# Patient Record
Sex: FEMALE | Race: WHITE | NOT HISPANIC OR LATINO | Employment: STUDENT | URBAN - METROPOLITAN AREA
[De-identification: names, ages, dates, MRNs, and addresses within clinical notes are randomized per-mention and may not be internally consistent; named-entity substitution may affect disease eponyms.]

---
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
## 2021-06-01 RX ORDER — NORETHINDRONE ACETATE AND ETHINYL ESTRADIOL 1.5-30(21)
1 KIT ORAL DAILY
COMMUNITY
Start: 2021-05-24 | End: 2021-06-01 | Stop reason: SDUPTHER

## 2021-06-01 RX ORDER — NORETHINDRONE ACETATE AND ETHINYL ESTRADIOL 1.5-30(21)
1 KIT ORAL DAILY
Qty: 28 TABLET | Refills: 2 | Status: SHIPPED | OUTPATIENT
Start: 2021-06-01 | End: 2021-11-09

## 2024-07-22 ENCOUNTER — OFFICE VISIT (OUTPATIENT)
Dept: NEUROLOGY | Facility: CLINIC | Age: 24
End: 2024-07-22
Payer: COMMERCIAL

## 2024-07-22 VITALS
WEIGHT: 96.44 LBS | SYSTOLIC BLOOD PRESSURE: 107 MMHG | HEIGHT: 59 IN | BODY MASS INDEX: 19.44 KG/M2 | HEART RATE: 74 BPM | RESPIRATION RATE: 17 BRPM | TEMPERATURE: 98 F | DIASTOLIC BLOOD PRESSURE: 71 MMHG

## 2024-07-22 DIAGNOSIS — G43.709 CHRONIC MIGRAINE WITHOUT AURA WITHOUT STATUS MIGRAINOSUS, NOT INTRACTABLE: Primary | ICD-10-CM

## 2024-07-22 DIAGNOSIS — Z15.89 MTHFR MUTATION: ICD-10-CM

## 2024-07-22 PROCEDURE — 99999 PR PBB SHADOW E&M-EST. PATIENT-LVL V: CPT | Mod: PBBFAC,,, | Performed by: PHYSICIAN ASSISTANT

## 2024-07-22 PROCEDURE — 99205 OFFICE O/P NEW HI 60 MIN: CPT | Mod: S$GLB,,, | Performed by: PHYSICIAN ASSISTANT

## 2024-07-22 PROCEDURE — 3074F SYST BP LT 130 MM HG: CPT | Mod: CPTII,S$GLB,, | Performed by: PHYSICIAN ASSISTANT

## 2024-07-22 PROCEDURE — 3078F DIAST BP <80 MM HG: CPT | Mod: CPTII,S$GLB,, | Performed by: PHYSICIAN ASSISTANT

## 2024-07-22 PROCEDURE — 3008F BODY MASS INDEX DOCD: CPT | Mod: CPTII,S$GLB,, | Performed by: PHYSICIAN ASSISTANT

## 2024-07-22 PROCEDURE — 1159F MED LIST DOCD IN RCRD: CPT | Mod: CPTII,S$GLB,, | Performed by: PHYSICIAN ASSISTANT

## 2024-07-22 PROCEDURE — 1160F RVW MEDS BY RX/DR IN RCRD: CPT | Mod: CPTII,S$GLB,, | Performed by: PHYSICIAN ASSISTANT

## 2024-07-22 RX ORDER — GABAPENTIN 100 MG/1
CAPSULE ORAL
Qty: 30 CAPSULE | Refills: 6 | Status: SHIPPED | OUTPATIENT
Start: 2024-07-22

## 2024-07-22 RX ORDER — PREDNISONE 20 MG/1
TABLET ORAL
Qty: 12 TABLET | Refills: 0 | Status: SHIPPED | OUTPATIENT
Start: 2024-07-22

## 2024-07-22 RX ORDER — UBROGEPANT 100 MG/1
TABLET ORAL
Qty: 14 TABLET | Refills: 4 | Status: SHIPPED | OUTPATIENT
Start: 2024-07-22

## 2024-07-22 NOTE — PATIENT INSTRUCTIONS
To reduce migraines:  Try the gabapentin 1 pill 2 hours before bed   PT script given      To factory reset  Try the deltasone (prednisone) on full stomach at breakfast time only, 3 pills for 2 days, 2 pills for 2 days, 1 pill for 2 days then off      To abort headaches:   Ubrelvy, 1 at onset headache, second pill 2 hours later if needed, no more than 2 pills day/3 days use in week <---went to [facility]       Please track your headaches, consider migraine emma free alfredo for [employer]

## 2024-07-22 NOTE — PROGRESS NOTES
[facility]  [address] [facility] [address]  [place] [address]  Phone:[phone]  Fax: [phone]   New Patient Consultation    Patient Name: Prachi Wang  : 2000  MRN:  [id number]  Today: 2024   chief complaint: Headache    PCP: Minnie Gonzalez, DEMETRIUS.       Assessment:   Prachi Wang is a 23 y.o. right handed, female with a PMHx of: MTHFR mutation and HA  whom presents with her mother in self referral for HA. Appears to have chronic migraine without aura. Myofascial pain likely contributes.       Review:    ICD-10-CM ICD-9-CM   1. Chronic migraine without aura without status migrainosus, not intractable  G43.709 346.70   2. MTHFR mutation  Z15.89 V84.89         Plan:   Discussed realistic goals of care with patient at length. Discussed medication options, need for lifestyle adjustment. Discussed treatment will take time. Goal will be to reduce frequency/intensity/quantity of HA, not to be completely HA free. Gave copy of [facility] triggers for migraine informational sheet (N.b., a standard I give to patients who come to seek my care in HA clinic, regardless if they have migraines or not) and discussed clinic's non narcotic policy re: HA. Patient voiced understanding and agreement.            -will have patient track HA, discussed alfredo for smart phone           -will have patient work on lifestyle           -if HA change in quality/nature, will get updated imaging study             -discussed potential for teratogenicity with treatment, if her family planning status should change, discussed I'd like her to let office know immediately. She voice agreement    For HA Prevention:  1 offered pamelor vs topamax vs neurontin (checked ), chose neurontin. Discussed adv effects/dosing, potential interaction with her BC, she voiced agreement   2 PT order given        For HA :  1 will hold off on triptans/ergots, given hx of MTHFR  mutation (increase risk of vascular accident)   2 ubrlevy written, discussed adv effects/dosing, she agreed     To break up Headaches:  Course of deltasone to start tomorrow, discussed taper schedule (wrote it out with read back), take on full stomach at breakfast time only, take until completion and discussed side effects. The patient agreed.       Other:  N/a           All test results as well as any necessary instructions were reviewed and discussed with patient.    Review:  Orders Placed This Encounter    Ambulatory referral/consult to Physical/Occupational Therapy    gabapentin (NEURONTIN) 100 MG capsule    predniSONE (DELTASONE) 20 MG tablet    ubrogepant (UBRELVY) 100 mg tablet         Patient to return to PCP/other specialists for all other problems  Patient to continue on all medications as Rx'd   A detailed AVS was provided to the patient with patient readback   RTO- 6-8 weeks to review HA log   The patient indicates understanding of these issues and agrees to the plan.    HPI:   Prachi Wang is a 23 y.o.right handed, female with a PMHx of: MTHFR mutation and HA  whom presents with her mother in self referral for HA.     ER visit 7/12/2024, notes reviewed.     HA HPI:  Start:2018 (wearing headphones/going on trips) possibly triggered her HA, have been persistant over years, worse just recently (went to ER---notes reviewed, 7/12/2024).    History of trauma (no), History of CNS infection (no), History of Stroke (no)  Location:back of head (L>R), can move to high parietal   Severity: range: 2-10/10  Duration:all day, worse upon awakening   Frequency:daily 30 HD/30   Associated factors (bolded positive) WITH HA ( or migraine): Nausea, vomiting, photophobia, feeling off balance, phonophobia, tinnitus, scalp pain, vision loss, diplopia, scintillations, eye pain, jaw pain, weakness?    Tried:OTC   Triggers (in bold): wearing head phone, looking at screen, stress, lack of sleep, too much  caffeine, too little caffeine, weather change, seasonal change, strong odours, bright lights, sunlight, food ,other  Last HA: 2 days ago   Positives in bold: Hx of Kidney Stones, asthma, GI bleed, osteoporosis, CAD/MI, CVA/TIA, DM  <---denies   Imaging on file: none   Therapies tried in past: (failures to be marked, if known---why did it fail?)  Compazine injection  Benadryl injection  Toradol injection  Ativan  Celexa   Muscle relaxant (unknown).   Doxepin      Medication Reconciliation:   Current Outpatient Medications   Medication Sig Dispense Refill    BLISOVI FE 1.5/30, 28, 1.5 mg-30 mcg (21)/75 mg (7) tablet TAKE ONE TABLET BY MOUTH ONCE DAILY 28 tablet 1    fish oil-omega-3 fatty acids 300-1,000 mg capsule Take 2 capsules by mouth once daily.      LORazepam (ATIVAN) 0.5 MG tablet Take 1 tablet (0.5 mg total) by mouth every 12 (twelve) hours as needed for Anxiety. 60 tablet 2    magnesium citrate solution Take 296 mLs by mouth daily as needed.      multivitamin capsule Take 1 capsule by mouth once daily.      vitamin D (VITAMIN D3) 1000 units Tab Take 1,000 Units by mouth once daily.       No current facility-administered medications for this visit.     Review of patient's allergies indicates:   Allergen Reactions    Citalopram analogues Anaphylaxis     Throat swelling       PMHx:  Past Medical History:   Diagnosis Date    Constipation     MTHFR mutation     Syncope     UTI (urinary tract infection)      Past Surgical History:   Procedure Laterality Date    DENTAL SURGERY         Fhx:  Family History   Problem Relation Name Age of Onset    Brain cancer Maternal Grandmother      Diabetes Paternal Grandmother      Cancer Paternal Grandmother         Shx: respiratory therapist at [employer]   Social History     Socioeconomic History    Marital status: Single   Tobacco Use    Smoking status: Never    Smokeless tobacco: Never   Substance and Sexual Activity    Alcohol use: No    Drug use: No    Sexual activity:  Never     Social Determinants of Health     Financial Resource Strain: Low Risk  (7/16/2024)    Overall Financial Resource Strain (CARDIA)     Difficulty of Paying Living Expenses: Not hard at all   Food Insecurity: No Food Insecurity (7/16/2024)    Hunger Vital Sign     Worried About Running Out of Food in the Last Year: Never true     Ran Out of Food in the Last Year: Never true   Physical Activity: Insufficiently Active (7/16/2024)    Exercise Vital Sign     Days of Exercise per Week: 3 days     Minutes of Exercise per Session: 30 min   Stress: Stress Concern Present (7/16/2024)    [employer] - Occupational Stress Questionnaire     Feeling of Stress : Very much   Housing Stability: Unknown (7/16/2024)    Housing Stability Vital Sign     Unable to Pay for Housing in the Last Year: No           Labs:   Reviewed in chart     Imaging:   Reviewed in chart       Other testing:  Reviewed in chart     Note: I have independently reviewed any/all imaging/labs/tests and agree with the report (s) as documented.  Any discrepancies will be as noted/demarcated by free text.  LISSETH ZHONG 7/22/2024                     ROS:   Review Of Systems (questions asked, positive or additions in BOLD)  Gen: Weight change, fatigue/malaise, pyrexia   HEENT: Tinnitus, headache,  blurred vision, eye pain, diplopia, photophobia,  nose bleeds, congestion, sore throat, jaw pain, scalp pain, neck stiffness   Card: Palpitations, CP   Pulm: SOB, cough   Vas: Easy bruising, easy bleeding   GI: N/V/D/C, incontinence, hematemesis, hematochezia    : incontinence, hematuria   Endocrine: Temp intolerance, polyuria, polydipsia   M/S: Neck pain, myalgia, back pain, joint pain, falls    Neuro: PER HPI   PSY: Memory loss, confusion, depression, anxiety, trouble in sleep, hallucinations          EXAM:   /71 (BP Location: Right arm, Patient Position: Sitting, BP Method: Medium (Automatic))   Pulse 74   Temp 98.2 °F (36.8 °C)  "(Temporal)   Resp 17   Ht 4' 11" (1.499 m)   Wt 43.8 kg (96 lb 7.2 oz)   LMP 07/08/2024   BMI 19.48 kg/m²    GEN:  NAD  HEENT: NC/AT, Frontalis was NTTP, temporalis was NTTP, vertex NTTP,  nares patent, dentition appropriate,   neck supple, trachea midline, Occiput and trapezius  TTP, sits with head forward posture      EXTREM:   no edema present.    NEURO:  Mental Status:  Awake, alert and appropriately oriented to time, place, and person.  Normal attention and concentration.  Speech is fluent and appropriate language function (I.e., comprehension).     Cranial Nerves:    Pupils are equal and reactive to light.  Extraocular movements are intact and without nystagmus.  Visual fields are full to confrontation testing.  Facial movement is symmetric.  Facial sensation is intact.  Hearing is normal. Uvula in midline. FROM of neck in all (6) directions, shoulder shrug symmetrical. Tongue in midline without fasiculation.     Motor:  RUE:appropriate against gravity and medium force as tested 5/5              LUE: appropriate against gravity and medium force as tested 5/5              RLE:appropriate against gravity and medium force as tested 5/5              LLE: appropriate against gravity and medium force as tested 5/5  Tremor/pronator drift not apparent.    finger extension strength was strong bilat     Sensory:  RUE  intact light touch, proprioception, and temperature  LUE intact light touch, proprioception, and temperature    RLE intact light touch  LLE intact light touch      DTR's:                                            R              L  biceps 2+ 2+         brachioradialis 2+ 2+   Knee jerk 2+ 2+        Coordination:  FTN-WNL.      Gait and Stance: Normal manner of stance and gait function testing. Romberg was negative.          This document has been electronically signed by Mr. Rigoberto George MPA, PA-C on 7/22/2024, I have personally typed this message using the EMR.       Dr Evin MD  was available " during today's visit.     Personal Protective Equipment:    Personal Protective Equipment was used during this encounter including:   non latex gloves.          CC: Minnie Gonzalez NP

## 2024-07-31 ENCOUNTER — NURSE TRIAGE (OUTPATIENT)
Dept: ADMINISTRATIVE | Facility: CLINIC | Age: 24
End: 2024-07-31
Payer: COMMERCIAL

## 2024-07-31 NOTE — TELEPHONE ENCOUNTER
Spoke with pt who reports that she was recently prescribed prednisone. She reports after taking medication she felt her heart beating faster. states HR is between 117-120. Reports feeling anxious. Asking if she should continue taking medication as prescribed. Advised to be seen within next 3 days. Nurse did attempt to reach neurology office regarding matter    Secure chat message sent to Lizzeth Tomlinson RN. Lizette Hardy included on secure chat. States provider are gone for the day, but will send message to provider, who might not see see it until 7:30 AM    Pt informed. Also advised pt can be seen in ED/UC. OOD VV also offered. Verbalized understanding        Reason for Disposition   [1] Palpitations AND [2] no improvement after using Care Advice    Additional Information   Negative: Passed out (e.g., fainted, lost consciousness, blacked out and was not responding)   Negative: Shock suspected (e.g., cold/pale/clammy skin, too weak to stand, low BP, rapid pulse)   Negative: Difficult to awaken or acting confused (e.g., disoriented, slurred speech)   Negative: Visible sweat on face or sweat dripping down face   Negative: Unable to walk, or can only walk with assistance (e.g., requires support)   Negative: [1] Received SHOCK from implantable cardiac defibrillator AND [2] persisting symptoms (i.e., palpitations, lightheadedness)   Negative: [1] Dizziness, lightheadedness, or weakness AND [2] heart beating very rapidly (e.g., > 140 / minute)   Negative: [1] Feeling weak or lightheaded (e.g., woozy, feeling like they might faint) AND [2] heart beating very slowly (e.g., < 50 / minute)   Negative: Sounds like a life-threatening emergency to the triager   Negative: Difficulty breathing   Negative: Feeling weak or lightheaded (e.g., woozy, feeling like they might faint)   Negative: [1] Heart beating very rapidly (e.g., > 140 / minute) AND [2] present now  (Exception: During exercise.)   Negative: Heart beating very slowly  "(e.g., < 50 / minute)  (Exception: Athlete and heart rate normal for caller.)   Negative: New or worsened shortness of breath with activity (dyspnea on exertion)   Negative: Patient sounds very sick or weak to the triager   Negative: [1] Heart beating very rapidly (e.g., > 140 / minute) AND [2] not present now  (Exception: During exercise.)   Negative: [1] Skipped or extra beat(s) AND [2] increases with exercise or exertion   Negative: [1] Skipped or extra beat(s) AND [2] occurs 4 or more times per minute   Negative: New or worsened ankle swelling   Negative: History of heart disease (i.e., heart attack, bypass surgery, angina, angioplasty, CHF)  (Exception: Brief heartbeat symptoms that went away and now feels well.)   Negative: Age > 60 years  (Exception: Brief heartbeat symptoms that went away and now feels well.)   Negative: Taking water pill (i.e., diuretic) or heart medication (e.g., digoxin)   Negative: Wearing a cardiac monitor (e.g., cardiac event, Holter)   Negative: [1] Received SHOCK from implantable cardiac defibrillator AND [2] now feels well   Negative: Heart rhythm alert (e.g., "you have irregular heartbeat") from personal wearable device (e.g., Apple Watch)   Negative: History of hyperthyroidism or taking thyroid medication   Negative: Substance use (drug use) or misuse, known or suspected   Negative: [1] ADHD AND [2] taking stimulant medicine    Protocols used: Heart Rate and Heartbeat [id number]    "

## 2024-08-01 NOTE — TELEPHONE ENCOUNTER
Spoke with patient, she reported starting Prednisone yesterday 7/31/2024 because it was told to her to take at breaksfast with a full stomach. She was unable to eat a full breakfast while working, she waited to a day off to be able to eat a full breakfast.  Therefore she did not start the medication till yesterday.  Today she took the 2 day dose and her heart rate did not increase like the previous day.  Explained the dosing of the Prednisone was for breaking a cycle of headache.  She does need to take it with food and preferably before 12:00 so that it does not cause insomnia at night. She should continue with the medication till the prescribed dosing is complete. Verbalized understanding

## 2024-08-01 NOTE — TELEPHONE ENCOUNTER
I wrote a 5 day course of deltasone for her back on the 22nd, it should be done already. When did she start it and what dose is she at now?

## 2024-12-26 RX ORDER — GABAPENTIN 100 MG/1
CAPSULE ORAL
Qty: 30 CAPSULE | Refills: 6 | Status: SHIPPED | OUTPATIENT
Start: 2024-12-26

## 2025-01-29 ENCOUNTER — OFFICE VISIT (OUTPATIENT)
Dept: NEUROLOGY | Facility: CLINIC | Age: 25
End: 2025-01-29
Payer: COMMERCIAL

## 2025-01-29 VITALS
BODY MASS INDEX: 20.42 KG/M2 | HEIGHT: 59 IN | DIASTOLIC BLOOD PRESSURE: 64 MMHG | TEMPERATURE: 98 F | HEART RATE: 84 BPM | RESPIRATION RATE: 17 BRPM | SYSTOLIC BLOOD PRESSURE: 106 MMHG | WEIGHT: 101.31 LBS

## 2025-01-29 DIAGNOSIS — G43.709 CHRONIC MIGRAINE WITHOUT AURA WITHOUT STATUS MIGRAINOSUS, NOT INTRACTABLE: Primary | ICD-10-CM

## 2025-01-29 DIAGNOSIS — M79.18 MYOFASCIAL PAIN: ICD-10-CM

## 2025-01-29 PROCEDURE — 3008F BODY MASS INDEX DOCD: CPT | Mod: CPTII,S$GLB,, | Performed by: PHYSICIAN ASSISTANT

## 2025-01-29 PROCEDURE — 3074F SYST BP LT 130 MM HG: CPT | Mod: CPTII,S$GLB,, | Performed by: PHYSICIAN ASSISTANT

## 2025-01-29 PROCEDURE — 1159F MED LIST DOCD IN RCRD: CPT | Mod: CPTII,S$GLB,, | Performed by: PHYSICIAN ASSISTANT

## 2025-01-29 PROCEDURE — 99999 PR PBB SHADOW E&M-EST. PATIENT-LVL IV: CPT | Mod: PBBFAC,,, | Performed by: PHYSICIAN ASSISTANT

## 2025-01-29 PROCEDURE — 3078F DIAST BP <80 MM HG: CPT | Mod: CPTII,S$GLB,, | Performed by: PHYSICIAN ASSISTANT

## 2025-01-29 PROCEDURE — 1160F RVW MEDS BY RX/DR IN RCRD: CPT | Mod: CPTII,S$GLB,, | Performed by: PHYSICIAN ASSISTANT

## 2025-01-29 PROCEDURE — 99214 OFFICE O/P EST MOD 30 MIN: CPT | Mod: S$GLB,,, | Performed by: PHYSICIAN ASSISTANT

## 2025-01-29 RX ORDER — TIZANIDINE 2 MG/1
TABLET ORAL
Qty: 30 TABLET | Refills: 6 | Status: SHIPPED | OUTPATIENT
Start: 2025-01-29

## 2025-01-29 RX ORDER — CELECOXIB 100 MG/1
CAPSULE ORAL
Qty: 10 CAPSULE | Refills: 0 | Status: SHIPPED | OUTPATIENT
Start: 2025-01-29

## 2025-01-29 NOTE — PATIENT INSTRUCTIONS
"To reduce headaches:  Try the tizanadine (zanaflex) ---muscle relaxant, 2 hours before bed, use for a few weeks then switch to "as needed/2 hours before bed"---no use with alcohol/no driving   Suggested that the patient research out OTC supplements (stressed NOT FDA regulated and should take at own risk).    To help prevent a headache:    Vitamin B2 (riboflavin)  CoQ10  Magnesium  "Migraeeze" which is petadolex/Vitamin B2/ginger  "Dolovent" which is magnesium/Vitamin B2/coq10    * all of the above can be found locally in a variety of shops or on the internet           To abort headaches:  Ubrelvy      To "factory reset"  Celebrex, 1 pill twice a day with food, take until completion   "

## 2025-01-29 NOTE — PROGRESS NOTES
"[facility]  [address] [facility] [address]  [place] [address]  Phone:[phone]  Fax: [phone]  Follow up visit     Patient Name: Prachi Wang  : 2000  MRN:  [id number]  Today: 2025   LOV: 2024  chief complaint: Headache    PCP: Minnie Gonzalez NP.       Assessment:   Prachi Wang is a 24 y.o. right handed, female with a PMHx of: MTHFR mutation and CLAUDIO  whom presents solo in follow up for HA. Appears to have chronic migraine without aura. Myofascial pain likely contributes.       Review:    ICD-10-CM ICD-9-CM   1. Chronic migraine without aura without status migrainosus, not intractable  G43.709 346.70   2. Myofascial pain  M79.18 729.1           Plan:               -if HA change in quality/nature, will get updated imaging study             -discussed potential for teratogenicity with treatment, if her family planning status should change, discussed I'd like her to let office know immediately. She voice agreement    For HA Prevention:  1Try the tizanadine (zanaflex) ---muscle relaxant, 2 hours before bed, use for a few weeks then switch to "as needed/2 hours before bed"---no use with alcohol/no driving   2 Suggested that the patient research out OTC supplements (stressed NOT FDA regulated and should take at own risk).            To help prevent a headache:          Vitamin B2 (riboflavin)        CoQ10        Magnesium       "Migraeeze" which is petadolex/Vitamin B2/ginger       "Dolovent" which is magnesium/Vitamin B2/coq10       For HA :  1 will hold off on triptans/ergots, given hx of MTHFR mutation (increase risk of vascular accident)   2 ubrlevy      To break up Headaches:  Course of celebrex, discussed adv effects/dosing, take until completion, she agreed     Other:  N/a           All test results as well as any necessary instructions were reviewed and discussed with patient.    Review:  Orders Placed This " "Encounter    tiZANidine (ZANAFLEX) 2 MG tablet    celecoxib (CELEBREX) 100 MG capsule           Patient to return to PCP/other specialists for all other problems  Patient to continue on all medications as Rx'd   A detailed AVS was provided to the patient with patient readback   RTO- 6-8 weeks to review HA log   The patient indicates understanding of these issues and agrees to the plan.    HPI:   Prachi Wang is a 24 y.o.right handed, female with a PMHx of: MTHFR mutation and CLAUDIO  whom presents solo in follow up for HA.     At LOV (7/22/2024):  gabapentin, PT, ubrelvy and deltasone ordered.   Daily CLAUDIO  Has had some pains in frontalis and high parietal region along with LEFT occipitalis  Pain is 24/7  Ubrelvy helps  Was on gabapentin for a week but got nervous and stopped (concern from colleagues that she was "too young to be on this medicine")  Steroids did help but caused palpitations  Never did PT   Not sleeping   Under stress, pending getting   Unclear about her family planning status at this time  No vision changes      From first visit (with additions) on 7/22/2024  ER visit 7/12/2024, notes reviewed.     HA HPI:  Start:2018 (wearing headphones/going on trips) possibly triggered her HA, have been persistant over years, worse just recently (went to ER---notes reviewed, 7/12/2024).    History of trauma (no), History of CNS infection (no), History of Stroke (no)  Location:back of head (L>R), can move to high parietal   Severity: range: 2-10/10  Duration:all day, worse upon awakening   Frequency:daily 30 HD/30   Associated factors (bolded positive) WITH HA ( or migraine): Nausea, vomiting, photophobia, feeling off balance, phonophobia, tinnitus, scalp pain, vision loss, diplopia, scintillations, eye pain, jaw pain, weakness?    Tried:OTC   Triggers (in bold): wearing head phone, looking at screen, stress, lack of sleep, too much caffeine, too little caffeine, weather change, seasonal change, " strong odours, bright lights, sunlight, food ,other  Last HA: 2 days ago   Positives in bold: Hx of Kidney Stones, asthma, GI bleed, osteoporosis, CAD/MI, CVA/TIA, DM  <---denies   Imaging on file: none   Therapies tried in past: (failures to be marked, if known---why did it fail?)  Compazine injection  Benadryl injection  Toradol injection  Ativan  Celexa   Muscle relaxant (unknown).   Doxepin  Gabapentin  Ubrelvy  Deltasone       Medication Reconciliation:   Current Outpatient Medications   Medication Sig Dispense Refill    BLISOVI FE 1.5/30, 28, 1.5 mg-30 mcg (21)/75 mg (7) tablet TAKE ONE TABLET BY MOUTH ONCE DAILY 28 tablet 1    fish oil-omega-3 fatty acids 300-1,000 mg capsule Take 2 capsules by mouth once daily.      magnesium citrate solution Take 296 mLs by mouth daily as needed.      multivitamin capsule Take 1 capsule by mouth once daily.      predniSONE (DELTASONE) 20 MG tablet On full stomach (breakfast): 3 tabs for 2 days, 2 tabs for 2 days, 1 tab for 2 days. Finish 12 tablet 0    ubrogepant (UBRELVY) 100 mg tablet Take 1 tablet at onset headache, second tablet 2 hours later if needed, no more than 2 tablets day/3 days use in week 14 tablet 4    vitamin D (VITAMIN D3) 1000 units Tab Take 1,000 Units by mouth once daily.      gabapentin (NEURONTIN) 100 MG capsule 1 pill 2 hours before bed every night 30 capsule 6     No current facility-administered medications for this visit.     Review of patient's allergies indicates:   Allergen Reactions    Citalopram analogues Anaphylaxis     Throat swelling       PMHx:  Past Medical History:   Diagnosis Date    Constipation     MTHFR mutation     Syncope     UTI (urinary tract infection)      Past Surgical History:   Procedure Laterality Date    DENTAL SURGERY         Fhx:  Family History   Problem Relation Name Age of Onset    Brain cancer Maternal Grandmother      Diabetes Paternal Grandmother      Cancer Paternal Grandmother         Shx: respiratory therapist  at [employer]   Social History     Socioeconomic History    Marital status: Single   Tobacco Use    Smoking status: Never    Smokeless tobacco: Never   Substance and Sexual Activity    Alcohol use: No    Drug use: No    Sexual activity: Never     Social Drivers of Health     Financial Resource Strain: Low Risk  (7/16/2024)    Overall Financial Resource Strain (CARDIA)     Difficulty of Paying Living Expenses: Not hard at all   Food Insecurity: No Food Insecurity (7/16/2024)    Hunger Vital Sign     Worried About Running Out of Food in the Last Year: Never true     Ran Out of Food in the Last Year: Never true   Physical Activity: Insufficiently Active (7/16/2024)    Exercise Vital Sign     Days of Exercise per Week: 3 days     Minutes of Exercise per Session: 30 min   Stress: Stress Concern Present (7/16/2024)    [employer] - Occupational Stress Questionnaire     Feeling of Stress : Very much   Housing Stability: Unknown (7/16/2024)    Housing Stability Vital Sign     Unable to Pay for Housing in the Last Year: No           Labs:   Reviewed in chart     Imaging:   Reviewed in chart       Other testing:  Reviewed in chart     Note: I have independently reviewed any/all imaging/labs/tests and agree with the report (s) as documented.  Any discrepancies will be as noted/demarcated by free text.  LISSETH ZHONG 1/29/2025                     ROS:   Review Of Systems (questions asked, positive or additions in BOLD)  Gen: Weight change, fatigue/malaise, pyrexia   HEENT: Tinnitus, headache,  blurred vision, eye pain, diplopia, photophobia,  nose bleeds, congestion, sore throat, jaw pain, scalp pain, neck stiffness   Card: Palpitations, CP   Pulm: SOB, cough   Vas: Easy bruising, easy bleeding   GI: N/V/D/C, incontinence, hematemesis, hematochezia    : incontinence, hematuria   Endocrine: Temp intolerance, polyuria, polydipsia   M/S: Neck pain, myalgia, back pain, joint pain, falls    Neuro: PER HPI  "  PSY: Memory loss, confusion, depression, anxiety, trouble in sleep, hallucinations          EXAM:   /64 (BP Location: Left arm, Patient Position: Sitting)   Pulse 84   Temp 97.6 °F (36.4 °C) (Temporal)   Resp 17   Ht 4' 11" (1.499 m)   Wt 45.9 kg (101 lb 4.8 oz)   LMP 01/22/2025   BMI 20.46 kg/m²    GEN:  NAD though tearful at times   HEENT: NC/AT, Frontalis was NTTP, temporalis was NTTP, vertex NTTP,  nares patent, dentition appropriate,   neck supple, trachea midline, Occiput and trapezius  TTP,   EXTREM:   no edema present.    NEURO:  Mental Status:  Awake, alert and appropriately oriented to time, place, and person.  Normal attention and concentration.  Speech is fluent and appropriate language function (I.e., comprehension).     Cranial Nerves:      Extraocular movements are intact and without nystagmus.  Visual fields are full to confrontation testing.  Facial movement is symmetric.  Facial sensation is intact.  Hearing is normal. Uvula in midline. FROM of neck in all (6) directions, shoulder shrug symmetrical. Tongue in midline without fasiculation.     Motor:  RUE:appropriate against gravity and medium force as tested 5/5              LUE: appropriate against gravity and medium force as tested 5/5              RLE:appropriate against gravity and medium force as tested 5/5              LLE: appropriate against gravity and medium force as tested 5/5  Tremor/pronator drift not apparent.    finger extension strength was strong bilat     Sensory:  RUE  intact light touch, proprioception   LUE intact light touch, proprioception     RLE intact light touch  LLE intact light touch      DTR's:                                            R              L                  Knee jerk 2+ 2+        Coordination:  FTN-WNL.      Gait and Stance: Normal manner of stance and gait function testing.          This document has been electronically signed by Mr. Rigoberto George MPA, PA-C on 1/29/2025, I have " personally typed this message using the EMR.       Dr Evin MD  was available during today's visit.     Personal Protective Equipment:    Personal Protective Equipment was used during this encounter including:   non latex gloves.          CC: Minnie Gonzalez NP

## 2025-03-03 RX ORDER — TIZANIDINE 2 MG/1
TABLET ORAL
Qty: 30 TABLET | Refills: 6 | Status: SHIPPED | OUTPATIENT
Start: 2025-03-03

## 2025-03-09 ENCOUNTER — PATIENT MESSAGE (OUTPATIENT)
Dept: NEUROLOGY | Facility: CLINIC | Age: 25
End: 2025-03-09
Payer: COMMERCIAL

## 2025-03-10 RX ORDER — TIZANIDINE 2 MG/1
TABLET ORAL
Qty: 30 TABLET | Refills: 6 | Status: SHIPPED | OUTPATIENT
Start: 2025-03-10

## 2025-06-03 ENCOUNTER — OFFICE VISIT (OUTPATIENT)
Dept: NEUROLOGY | Facility: CLINIC | Age: 25
End: 2025-06-03
Payer: COMMERCIAL

## 2025-06-03 ENCOUNTER — TELEPHONE (OUTPATIENT)
Dept: NEUROLOGY | Facility: CLINIC | Age: 25
End: 2025-06-03
Payer: COMMERCIAL

## 2025-06-03 VITALS
SYSTOLIC BLOOD PRESSURE: 104 MMHG | DIASTOLIC BLOOD PRESSURE: 69 MMHG | WEIGHT: 97.88 LBS | BODY MASS INDEX: 19.77 KG/M2 | HEART RATE: 75 BPM

## 2025-06-03 DIAGNOSIS — M54.81 OCCIPITAL NEURALGIA OF LEFT SIDE: ICD-10-CM

## 2025-06-03 DIAGNOSIS — M79.12 MYALGIA OF AUXILIARY MUSCLES, HEAD AND NECK: ICD-10-CM

## 2025-06-03 DIAGNOSIS — R42 LIGHTHEADEDNESS: ICD-10-CM

## 2025-06-03 DIAGNOSIS — G43.709 CHRONIC MIGRAINE WITHOUT AURA WITHOUT STATUS MIGRAINOSUS, NOT INTRACTABLE: Primary | ICD-10-CM

## 2025-06-03 PROCEDURE — 99999 PR PBB SHADOW E&M-EST. PATIENT-LVL IV: CPT | Mod: PBBFAC,,, | Performed by: INTERNAL MEDICINE

## 2025-06-03 RX ORDER — TOPIRAMATE 25 MG/1
25 TABLET, FILM COATED ORAL NIGHTLY
Qty: 30 TABLET | Refills: 11 | Status: SHIPPED | OUTPATIENT
Start: 2025-06-03 | End: 2026-06-03

## 2025-06-03 RX ORDER — DULOXETIN HYDROCHLORIDE 20 MG/1
20 CAPSULE, DELAYED RELEASE ORAL DAILY
Qty: 30 CAPSULE | Refills: 11 | Status: SHIPPED | OUTPATIENT
Start: 2025-06-03 | End: 2025-06-03

## 2025-06-06 ENCOUNTER — PATIENT MESSAGE (OUTPATIENT)
Dept: NEUROLOGY | Facility: CLINIC | Age: 25
End: 2025-06-06
Payer: COMMERCIAL

## 2025-06-13 ENCOUNTER — TELEPHONE (OUTPATIENT)
Dept: NEUROLOGY | Facility: CLINIC | Age: 25
End: 2025-06-13
Payer: COMMERCIAL

## 2025-06-13 NOTE — TELEPHONE ENCOUNTER
Copied from CRM #[id number]. Topic: Appointments - Amb Referral  >> Jun 12, 2025  4:46 PM Etta wrote:  Pt reqeusting orders mri of brain be faxed over [email or web address] in [facility] fax number [phone]

## 2025-06-17 ENCOUNTER — TELEPHONE (OUTPATIENT)
Dept: NEUROLOGY | Facility: CLINIC | Age: 25
End: 2025-06-17
Payer: COMMERCIAL

## 2025-06-17 ENCOUNTER — PATIENT MESSAGE (OUTPATIENT)
Facility: CLINIC | Age: 25
End: 2025-06-17
Payer: COMMERCIAL

## 2025-06-17 ENCOUNTER — TELEPHONE (OUTPATIENT)
Facility: CLINIC | Age: 25
End: 2025-06-17
Payer: COMMERCIAL

## 2025-06-17 NOTE — TELEPHONE ENCOUNTER
Copied from CRM #[id number]. Topic: General Inquiry - Patient Advice  >> Jun 17, 2025  3:58 PM Roberta wrote:  Type:  Patient Call    Who Called:Pt   Would the patient rather a call back or a response via [facility]? Call back   Best Call Back Number:[phone]  Additional Information: need something from provider emailed to  [email or web address]  to show she has an MRI on 06/23 to be excused from jury duty

## 2025-06-23 ENCOUNTER — PATIENT MESSAGE (OUTPATIENT)
Facility: CLINIC | Age: 25
End: 2025-06-23
Payer: COMMERCIAL

## 2025-06-30 ENCOUNTER — PATIENT MESSAGE (OUTPATIENT)
Dept: NEUROLOGY | Facility: CLINIC | Age: 25
End: 2025-06-30
Payer: COMMERCIAL

## 2025-07-01 ENCOUNTER — PATIENT MESSAGE (OUTPATIENT)
Dept: NEUROLOGY | Facility: CLINIC | Age: 25
End: 2025-07-01
Payer: COMMERCIAL

## 2025-07-07 NOTE — PROGRESS NOTES
Neurology [facility] - [facility]  Return Patient Visit    Telemedicine     The patient location is: [place]  Visit type: Virtual visit with synchronous audio and video  Each patient to whom he or she provides medical services by telemedicine is:  (1) informed of the relationship between the physician and patient and the respective role of any other health care provider with respect to management of the patient; and (2) notified that he or she may decline to receive medical services by telemedicine and may withdraw from such care at any time.     Subjective      REFERRAL SOURCE  No ref. provider found          CHIEF COMPLAINT/REASON FOR REFERRAL  Headache     HISTORY OF PRESENT ILLNESS  Prachi Wang is a 24 y.o. woman with a prior diagnosis of migraine, who is presenting to the [facility] for an office visit with a chief complaint of headache.     The patient states that they began to experience headaches around childhood. She says that headache was moderate. Light/sound sensitivity with menstrual period. She says that headache worsened over teenage years and then in her early 20's it has continued to worsen. It has worsened over the last couple of months. Pain is isolated to the left occiput region. She never has right sided pain.     NPV headache characteristics:  Headache Frequency:        Total: 30        Disablin     Duration of attacks: continuous  Timing to max pain: minutes   Time of day when headache is worse?: yes - evening; she can wake up in the middle of the night with headache  Headache location:    left occipital   Bilateral sharp / stabbing  Quality: throbbing / pulsating, aching/tight, and sharp/shooting/stabbing  Intensity: moderate and moderate to severe  Associated symptoms: photophobia, phonophobia, and aggravation with routine physical activity  Unilateral cranial autonomic features or restlessness: none  Premonitory symptoms: none  Aura  symptoms:   Type: none   Duration: migraine aura duration: none  Headache Red Flags:        New (or very out-of-proportion to previous) daily, continuous, and progressive headache in a patient over 50 (especially if subacute): no        Fevers/Drenching Night Sweats/Unintended Weight Loss/Hx of Cancer: no        Focal weakness: no        Severe 10/10 headache in <10 seconds (Thunderclap onset): no        Start a headache with coughing/sneezing/bending over: no        Headache absolutely worse with certain positions (lying down vs standing up): not resting        Start a headache with exercise or exertion: yes - starts during        Double vision: no        Transient Visual Obscurations: no        Loss of color vision: no        Pulsatile or whooshing tinnitus: yes - bilateral ringing only  Triggers: yes - stress, weather  Neck pain: yes - left Radiation up  Jaw pain/cramping with chewing, e.g., starts/stops when chewing due to pain/fatigue, lockjaw/decreased opening or cramping (including tongue) when eating: yes - left popping in pain in the past; she was told that it the pain was not tmj   Symptoms of dysautonomia: postural lightheadedness / dizziness, heart racing or skipped beats, near fainting when upright, and fainting when upright x 1 in 10/2024. When she uses the bathroom, she feels faint     Prior non-pharm treatments (CBT-Pain, PT, chiropractor, acupuncture, massage): no  History of asthma, constipation, kidney stones: yes - constipation  Psychiatric comorbidities: yes - anxiety  History of Head Trauma: no  Hypertension, Hyperlipidemia: no  Prior stroke: no  Coronary artery disease: no  Vascular malformation or aneurysm: no  Peripheral Vascular disease / Raynaud's: no    Caffeine use: yes - 1 coffee 3 times per week    Sleep comorbidities: Snoring present, witnessed apneas absent, sleep study no     Family history of headaches:  yes - grandmother had glioblastoma and headache before that     Last dilated  "eye exam: never   Any abnormalities? no     Menstrual status:  Did attacks start around menarche? no  Does the patient currently get their period? no  Worsening of migraine during menses? no  Does the patient use contraception? Type? yes  Is the patient currently pregnant or planning pregnancy in the near future? no  Is the patient menopausal? no  Using HRT? yes - progesterone and estrogen     Social History:  The patient lives in [place].   Employment: yes - [employer]  Tobacco use: no  Alcohol use: no  Substances: no  Mood: "Depends..."    The patient is unable to do the following activities easily because of their pain:  driving or traveling, cooking or preparing meals, cleaning, and shopping      ----------------  Interval Events:  7/8/2025 (virtual): Last seen 6/2025. Plan was TPI to left lateral / medial occipitalis resolved her pain 100% today. Will ask her to start topiramate at night for sleep / migraine and use ubrogepant and tizanidine for rescue. Will obtain MRI brain w/wo contrast given increased headache burden and worsening dysautonomia symptoms. Gave her conservative measures for POTS but will defer formal diagnosis to autonomic neurology. Ordered an EKG, stress EKG given her ability to start a headache with exertion. Since then, she saw cardiology who suspected that tizanidine may be contributing to her fainting episodes. Echo and EKG / stress were normal. She say that her headache has been improved. She says that ubrogepant has been effective. MR hs not yet been obtained. She has been drinking 1 electrolyte containing beverage every day to every other day. She says that she stopped topiramate because she had side effects. She says that she has only had 5 days of headache per month since her last visit.      ----------------     Current Acute Headache Medications:  -- Ubrelvy 100 mg  -- tizanidine 2 mg     Number of Days with acute medication use per week: 3      Current Prophylactic Headache " Medications:  --Magnesium citrate / glycinate      Previous headache treatment that was ineffective or not tolerated:  Acute:   Preventive: celexa, doxepin, topiramate  Devices:     Procedures:   6/2025 - left lateral/medial occipitalis TPI - 100% improvement and ongoing  ----------------     Past Medical History:   Diagnosis Date    Constipation     MTHFR mutation     Syncope     UTI (urinary tract infection)         Medications Ordered Prior to Encounter[1]     REVIEW OF SYSTEMS  As above     Objective      PHYSICAL EXAMINATION    There were no vitals taken for this visit.     General Exam: The patient is in no acute distress.  Psychiatric: The patient is alert, interactive, and has appropriate mood and affect.    Head and Neck:  Supratrochlear tenderness: No bilateral  Supraorbital tenderness: No bilateral  Auriculotemporal tenderness: No bilateral  Lesser occipital tenderness: Yes left  Greater occipital tenderness: Yes left  Cervical paraspinal muscle tenderness: No bilateral  Cervical ROM (normal flexion 50'; extension 80'; lateral flexion 45'; rotation 85'): Normal   Cervical facet loading: Negative bilateral  Spurling's sign: Negative bilateral     Neurologic Examination:  Mental Status: Mental status is normal to conversation. The patient is able to recall the details of their medical history without difficulty. Speech is clear. Language is grossly intact.    Cranial Nerves: Facial symmetry and strength is intact.   Gait: Normal gait.     ----------------     DIAGNOSTIC DATA     Laboratory Data:     Lab Results   Component Value Date    WBC 6.06 06/10/2025    HGB 13.4 06/10/2025    HCT 42.3 06/10/2025     (H) 06/10/2025     06/10/2025           CMP  Sodium   Date Value Ref Range Status   06/10/2025 141 136 - 145 mmol/L Final     Potassium   Date Value Ref Range Status   06/10/2025 4.2 3.5 - 5.1 mmol/L Final     Comment:     Anion Gap reference range revised on 4/28/2023     Chloride   Date  Value Ref Range Status   06/10/2025 104 95 - 110 mmol/L Final     CO2   Date Value Ref Range Status   06/10/2025 24 23 - 29 mmol/L Final     Glucose   Date Value Ref Range Status   06/10/2025 83 70 - 110 mg/dL Final     Comment:     The ADA recommends the following guidelines for fasting glucose:    Normal:       less than 100 mg/dL    Prediabetes:  100 mg/dL to 125 mg/dL    Diabetes:     126 mg/dL or higher       BUN   Date Value Ref Range Status   06/10/2025 11 6 - 20 mg/dL Final     Creatinine   Date Value Ref Range Status   06/10/2025 0.62 0.50 - 1.40 mg/dL Final     Calcium   Date Value Ref Range Status   06/10/2025 9.8 8.7 - 10.5 mg/dL Final     Total Protein   Date Value Ref Range Status   06/10/2025 7.8 6.0 - 8.4 g/dL Final     Albumin   Date Value Ref Range Status   06/10/2025 4.9 3.5 - 5.2 g/dL Final     Total Bilirubin   Date Value Ref Range Status   06/10/2025 0.7 0.2 - 1.0 mg/dL Final     Alkaline Phosphatase   Date Value Ref Range Status   06/10/2025 42 40 - 150 U/L Final     AST   Date Value Ref Range Status   06/10/2025 19 10 - 40 U/L Final     ALT   Date Value Ref Range Status   06/10/2025 18 10 - 44 U/L Final     Anion Gap   Date Value Ref Range Status   06/10/2025 13 8 - 16 mmol/L Final     Comment:     Anion Gap reference range revised on 4/28/2023     eGFR   Date Value Ref Range Status   06/10/2025 >60 >60 mL/min/1.73 m^2 Final        Imaging Data:    Xray:      CT:       MRI:        ----------------     Assessment & Plan      Prachi Wang is a 24 y.o. woman with a prior diagnosis of migraine, who is presenting to the [facility] for an office visit with a chief complaint of headache.     Last seen 6/2025. Plan was TPI to left lateral / medial occipitalis resolved her pain 100% today. Will ask her to start topiramate at night for sleep / migraine and use ubrogepant and tizanidine for rescue. Will obtain MRI brain w/wo contrast given increased headache burden and  worsening dysautonomia symptoms. Gave her conservative measures for POTS but will defer formal diagnosis to autonomic neurology. Ordered an EKG, stress EKG given her ability to start a headache with exertion. Since then, she saw cardiology who suspected that tizanidine may be contributing to her fainting episodes. Echo and EKG / stress were normal. She say that her headache has been improved. She says that ubrogepant has been effective. MR hs not yet been obtained. She has been drinking 1 electrolyte containing beverage every day to every other day. She says that she stopped topiramate because she had side effects. She says that she has only had 5 days of headache per month since her last visit.     Regarding prescription management, an active and shared decision was made to stop topiramate due to side effects and start propranolol 10 mg twice per day for headache prevention and to help POTS symptoms. Continue Ubrelvy    Chronic migraine, improving  Migraine without aura  Occipital neuralgia, left / myalgia cervical  Orthostatic lightheadedness / presyncope     -- Diagnostic studies and referrals recommended: None today  -- Preventive: Propranolol 10 mg by mouth twice per day  -- Supplements: Try Magnesium (oxide, glycinate, citrate, or combination) 400 mg by mouth twice per day (Side effect: stomach upset). Find at local [employer] or [employer] store. Try Riboflavin (VitB2) 200 mg by mouth twice per day (Side effect: bright yellow urine). Find at [employer] food store (Whole foods, etc.). Alternatively, there is a combination pill that you can try that has 600 mg of magnesium citrate (can cause loose stools), 400 mg Riboflavin, 300 mg CoQ10, 3 mg Melatonin, and 4000 IU VitD3 called MigraineMD that you can try at night.  -- Abortive: Took over Ubrelvy 100 mg and tizanidine 2 mg. Side effects discussed.  -- Nausea/Vomiting: None today  -- Medication overuse discussed. Limit the use of as needed medications to less  "than 2 to 3 days per week or 10 days per month to reduce the risk of medication overuse complications.  -- Future options: gabapentin, pregabalin; triptans; open to BTX     -- Recommend lifestyle modifications including the SEEDS for success in headache management, including Sleep hygiene, Exercising regularly, Eating healthy and regular meals, Drinking water and maintaining a headache Diary, and Stress reduction.  -- Therapeutic education and advocacy:        -Access the Migraine Toolkit, [facility] Migraine Patient Toolkit        -Visit the American Migraine Foundation (americanmigrainefoundation.org) website and the Move Against Migraine Facebook group for additional patient education    Pain Psychology Resources:  -- "Harnessing the Power of your Thoughts for Pain Control" - [employer] Back Pain Education Day 2016.   -- "Pain Catastrophizing" - [employer] education Youtube channel  -- Free 8-week Mindfulness Course - [employer] Mindfulness-Based Stress Reduction  -- [id number] Free Daysi for stress reduction developed by the [employer]       -- Follow-up recommended: 2 months     The total time spent for evaluation and management on including reviewing separately obtained history, performing a medically appropriate exam and evaluation, documenting clinical information in the health record, independently interpreting results and communicating them to the patient/family/caregiver, and ordering medications/tests/procedures was 29 minutes.    TELEEST 10 min    ---    Jerry Wayne MD  Headache and Pain Management  [facility] - [place], [place]           [1]   Current Outpatient Medications on File Prior to Visit   Medication Sig Dispense Refill    BLISOVI FE 1.5/30, 28, 1.5 mg-30 mcg (21)/75 mg (7) tablet TAKE ONE TABLET BY MOUTH ONCE DAILY 28 tablet 1    fish oil-omega-3 fatty acids 300-1,000 mg capsule Take 1 capsule by mouth once daily.      magnesium " citrate solution Take 296 mLs by mouth daily as needed.      multivitamin capsule Take 1 capsule by mouth once daily.      vitamin D (VITAMIN D3) 1000 units Tab Take 1,000 Units by mouth once daily.      [DISCONTINUED] tiZANidine (ZANAFLEX) 2 MG tablet Take 1 tablet by mouth 1-2 hours before bed as needed for neck pain and headaches 30 tablet 6    [DISCONTINUED] topiramate (TOPAMAX) 25 MG tablet Take 1 tablet (25 mg total) by mouth every evening. 30 tablet 11    [DISCONTINUED] ubrogepant (UBRELVY) 100 mg tablet Take 1 tablet at onset headache, second tablet 2 hours later if needed, no more than 2 tablets day/3 days use in week 14 tablet 4     No current facility-administered medications on file prior to visit.

## 2025-07-08 ENCOUNTER — OFFICE VISIT (OUTPATIENT)
Dept: NEUROLOGY | Facility: CLINIC | Age: 25
End: 2025-07-08
Payer: COMMERCIAL

## 2025-07-08 DIAGNOSIS — G43.709 CHRONIC MIGRAINE WITHOUT AURA WITHOUT STATUS MIGRAINOSUS, NOT INTRACTABLE: Primary | ICD-10-CM

## 2025-07-08 DIAGNOSIS — Z15.89 MTHFR MUTATION: ICD-10-CM

## 2025-07-08 DIAGNOSIS — R42 LIGHTHEADEDNESS: ICD-10-CM

## 2025-07-08 PROCEDURE — 98004 SYNCH AUDIO-VIDEO EST SF 10: CPT | Mod: 95,,, | Performed by: INTERNAL MEDICINE

## 2025-07-08 PROCEDURE — 3044F HG A1C LEVEL LT 7.0%: CPT | Mod: CPTII,95,, | Performed by: INTERNAL MEDICINE

## 2025-07-08 PROCEDURE — 1159F MED LIST DOCD IN RCRD: CPT | Mod: CPTII,95,, | Performed by: INTERNAL MEDICINE

## 2025-07-08 PROCEDURE — 1160F RVW MEDS BY RX/DR IN RCRD: CPT | Mod: CPTII,95,, | Performed by: INTERNAL MEDICINE

## 2025-07-08 RX ORDER — PROPRANOLOL HYDROCHLORIDE 10 MG/1
10 TABLET ORAL 2 TIMES DAILY
Qty: 60 TABLET | Refills: 11 | Status: SHIPPED | OUTPATIENT
Start: 2025-07-08 | End: 2026-07-08

## 2025-07-08 RX ORDER — TIZANIDINE 2 MG/1
TABLET ORAL
Qty: 30 TABLET | Refills: 6 | Status: SHIPPED | OUTPATIENT
Start: 2025-07-08

## 2025-07-09 ENCOUNTER — TELEPHONE (OUTPATIENT)
Dept: NEUROLOGY | Facility: CLINIC | Age: 25
End: 2025-07-09
Payer: COMMERCIAL

## 2025-07-09 NOTE — TELEPHONE ENCOUNTER
----- Message from Jerry Wayne MD sent at 2025  1:29 PM CDT -----  Regardin mo follow-up  2 months!

## 2025-08-06 DIAGNOSIS — Z15.89 MTHFR MUTATION: ICD-10-CM

## 2025-08-06 DIAGNOSIS — G43.709 CHRONIC MIGRAINE WITHOUT AURA WITHOUT STATUS MIGRAINOSUS, NOT INTRACTABLE: ICD-10-CM

## 2025-08-13 ENCOUNTER — TELEPHONE (OUTPATIENT)
Dept: NEUROLOGY | Facility: CLINIC | Age: 25
End: 2025-08-13
Payer: COMMERCIAL